# Patient Record
Sex: MALE | Race: WHITE | NOT HISPANIC OR LATINO | URBAN - METROPOLITAN AREA
[De-identification: names, ages, dates, MRNs, and addresses within clinical notes are randomized per-mention and may not be internally consistent; named-entity substitution may affect disease eponyms.]

---

## 2022-04-16 ENCOUNTER — EMERGENCY (EMERGENCY)
Facility: HOSPITAL | Age: 47
LOS: 1 days | Discharge: HOME | End: 2022-04-16
Attending: EMERGENCY MEDICINE | Admitting: EMERGENCY MEDICINE
Payer: COMMERCIAL

## 2022-04-16 VITALS
OXYGEN SATURATION: 98 % | TEMPERATURE: 98 F | RESPIRATION RATE: 18 BRPM | WEIGHT: 199.96 LBS | HEART RATE: 88 BPM | DIASTOLIC BLOOD PRESSURE: 112 MMHG | SYSTOLIC BLOOD PRESSURE: 163 MMHG

## 2022-04-16 VITALS
TEMPERATURE: 98 F | DIASTOLIC BLOOD PRESSURE: 90 MMHG | HEART RATE: 82 BPM | OXYGEN SATURATION: 97 % | RESPIRATION RATE: 16 BRPM | SYSTOLIC BLOOD PRESSURE: 142 MMHG

## 2022-04-16 PROCEDURE — 99284 EMERGENCY DEPT VISIT MOD MDM: CPT

## 2022-04-16 PROCEDURE — 73140 X-RAY EXAM OF FINGER(S): CPT | Mod: 26,LT

## 2022-04-16 PROCEDURE — 99283 EMERGENCY DEPT VISIT LOW MDM: CPT | Mod: 25

## 2022-04-16 PROCEDURE — 90471 IMMUNIZATION ADMIN: CPT

## 2022-04-16 PROCEDURE — 90715 TDAP VACCINE 7 YRS/> IM: CPT

## 2022-04-16 PROCEDURE — 73140 X-RAY EXAM OF FINGER(S): CPT

## 2022-04-16 RX ORDER — TETANUS TOXOID, REDUCED DIPHTHERIA TOXOID AND ACELLULAR PERTUSSIS VACCINE, ADSORBED 5; 2.5; 8; 8; 2.5 [IU]/.5ML; [IU]/.5ML; UG/.5ML; UG/.5ML; UG/.5ML
0.5 SUSPENSION INTRAMUSCULAR ONCE
Refills: 0 | Status: COMPLETED | OUTPATIENT
Start: 2022-04-16 | End: 2022-04-16

## 2022-04-16 RX ADMIN — TETANUS TOXOID, REDUCED DIPHTHERIA TOXOID AND ACELLULAR PERTUSSIS VACCINE, ADSORBED 0.5 MILLILITER(S): 5; 2.5; 8; 8; 2.5 SUSPENSION INTRAMUSCULAR at 10:51

## 2022-04-16 NOTE — ED PROVIDER NOTE - PATIENT PORTAL LINK FT
You can access the FollowMyHealth Patient Portal offered by [facility] by registering at the following website: [email or web address]. By joining [employer]’s FollowMyHealth portal, you will also be able to view your health information using other applications (apps) compatible with our system.

## 2022-04-16 NOTE — ED PROVIDER NOTE - PROVIDER TOKENS
PROVIDER:[TOKEN:[[id number]],FOLLOWUP:[1-3 Days]] PROVIDER:[TOKEN:[[id number]],FOLLOWUP:[1-3 Days]]

## 2022-04-16 NOTE — ED PROVIDER NOTE - ATTENDING CONTRIBUTION TO CARE
46 yo white male with fall here c/o isolated left hand pain. Exam revealed white male in NAD with marked left 4th finger tenderness, proximal>distal. I agree with plan and management outlined by PA.

## 2022-04-16 NOTE — ED PROVIDER NOTE - CARE PROVIDER_API CALL
Bimal Rdz)  Orthopaedic Surgery; Surgery of the Hand  [address] [place] [address]  Phone: [phone]  Fax: [phone]  Follow Up Time: 1-3 Days   Aki Calero (MD)  Plastic Surgery; Surgery of the Hand  [address], [address]  [place] [address]  Phone: [phone]  Fax: [phone]  Follow Up Time: 1-3 Days

## 2022-04-16 NOTE — ED PROVIDER NOTE - OBJECTIVE STATEMENT
48 y/o male without reported PMHx presents today due to fall down steps. pt reports he was carrying his baby, was able to stop himself from reaching bottom of steps but sustained left 4th finger injury. pt describes pain as aching, non-radiating, and currently 5/10. pt sustained abrasion to distal finger, pt is not UTD with tetanus. pt denies head injury, gaping laceration, numbness/weakness, headache, blood thinner use, back pain, hip pain, or any other complaints.

## 2022-04-16 NOTE — ED ADULT NURSE NOTE - NSIMPLEMENTINTERV_GEN_ALL_ED
Implemented All Universal Safety Interventions:  [place] to call system. Call bell, personal items and telephone within reach. Instruct patient to call for assistance. Room bathroom lighting operational. Non-slip footwear when patient is off stretcher. Physically safe environment: no spills, clutter or unnecessary equipment. Stretcher in lowest position, wheels locked, appropriate side rails in place.

## 2022-04-16 NOTE — ED PROVIDER NOTE - PROGRESS NOTE DETAILS
splint applied. All questions were answered. Discussed the importance of prompt, close medical follow-up. Patient will return with any changes, concerns or persistent/worsening symptoms.  Patient verbalized understanding. splint applied. Discussed with Dr. Calero advised splint and follow up monday. All questions were answered. Discussed the importance of prompt, close medical follow-up. Patient will return with any changes, concerns or persistent/worsening symptoms.  Patient verbalized understanding.

## 2022-04-16 NOTE — ED PROVIDER NOTE - NSFOLLOWUPINSTRUCTIONS_ED_ALL_ED_FT
1) Follow-up with hand specialist, See referred doctor. Call today/next business day for close, prompt follow-up.  2) Return to Emergency room for any worsening or persistent pain, weakness, numbness, fever, color change to extremity, or any other concerning symptoms.  3) Take ibuprofen 600 mg every  6 hours as needed.   4) You can consider discussing with your doctor if physical therapy or further imaging as an MRI may be beneficial.       Finger Fracture, Adult      A finger fracture is a break in any of the bones in your fingers. Your doctor may put a splint or cast on your finger so it will not move while it heals.      What are the causes?    The main cause of a finger fracture is injury, such as from playing sports, falling, or closing your finger in a drawer or door.      What increases the risk?    You may be more likely to get this condition:  •Playing sports.      •Working with machinery.      •If you have weak bones (osteoporosis).        What are the signs or symptoms?    The main symptoms of a fractured finger are pain, bruising, and swelling shortly after the injury.      How is this treated?    Treatment depends on how severe your fracture is. If the bones are still in place, your doctor may put your finger in a splint. If many fingers are fractured, you may need a cast. A cast may be placed up to the elbow. This will keep your fingers and hand from moving.    If the bones are out of place, your doctor may move them back into place by hand or by surgery.    You may also need to do physical therapy exercises to help your finger move better and get stronger.      Follow these instructions at home:      If you have a removable splint:   Hand showing finger splint on index and middle fingers and wrist.   •Wear the splint as told by your doctor. Take it off only as told by your doctor.      •Check the skin around the splint every day. Tell your doctor if you see problems.    •Loosen the splint if your fingers:  •Tingle.      •Become numb.      •Turn cold and blue.        •Keep the splint clean and dry.      If you have a nonremovable cast:     • Do not put pressure on any part of the cast until it is fully hardened.      • Do not stick anything inside the cast to scratch your skin.      •Check the skin around the cast every day. Tell your doctor if you see problems.      •You may put lotion on dry skin around the cast. Do not put lotion on the skin under the cast.      •Keep the cast clean and dry.      Bathing     • Do not take baths, swim, or use a hot tub. Ask your doctor if you may take showers.    •If the splint or cast is not waterproof:  •Do not let it get wet.      •Cover it with a watertight covering when you take a bath or shower.        Managing pain, stiffness, and swelling   •If told, put ice on the injured area. To do this:  •If you have a removable splint, take it off as told by your doctor.      •Put ice in a plastic bag.      •Place a towel between your skin and the bag, or between your cast and the bag.      •Leave the ice on for 20 minutes, 2–3 times a day.      •Take off the ice if your skin turns bright red. This is very important. If you cannot feel pain, heat, or cold, you have a greater risk of damage to the area.        •Move your fingers often.      •Raise the injured area above the level of your heart while you are sitting or lying down.      Activity     •Ask your doctor if you should avoid driving or using machines while you are taking your medicine.      •Do physical therapy exercises as told by your doctor.      •Return to your normal activities when your doctor says that it is safe.      •Ask your doctor when it is safe to drive if you have a splint or cast on your finger.      General instructions     • Do not smoke or use any products that contain nicotine or tobacco. Using these products can make it take longer for your bones to heal. If you need help quitting, ask your doctor.      •Take over-the-counter and prescription medicines only as told by your doctor.      •Keep all follow-up visits.        Contact a doctor if:    •Your pain or swelling gets worse, even with treatment.      •You have trouble moving your finger.        Get help right away if:    •Your finger gets numb or turns blue.        Summary    •A finger fracture is a break in any of the bones in your fingers.      •You may need to wear a splint or cast on your finger, so it will not move while it heals.      •If told, put ice on the injured area for 20 minutes, 2–3 times a day.      This information is not intended to replace advice given to you by your health care provider. Make sure you discuss any questions you have with your health care provider.

## 2022-04-16 NOTE — ED PROVIDER NOTE - CLINICAL SUMMARY MEDICAL DECISION MAKING FREE TEXT BOX
48 yo white male fell on stairs at home with 3+ yo son here now c/o left 4th finger area pain requiring evaluation and imaging.

## 2022-04-16 NOTE — ED PROVIDER NOTE - PHYSICAL EXAMINATION
Constitutional: Awake, Alert, non-toxic. NAD.  HEAD: Normocephalic, atraumatic.   EYES: PERRL, EOM intact, conjunctiva and sclera are clear bilaterally. No raccoon eyes.   ENT: No torres sign. No rhinorrhea, normal pharynx, patent, no tonsillar exudate or enlargement, mucous membranes pink/moist, no erythema, no drooling or stridor.   NECK: Supple, non-tender  BACK: No midline or paraspinal TTP of cervical/thoracic/lumbar spine, FROM. No ecchymosis or hematomas.   RESPIRATORY: Normal respiratory effort;  EXTREMITIES: Full passive and active ROM in all extremities; (+) left 4th finger PIP TTP and swelling, (+) limited ROM, (+) distal finger abrasion, no gaping laceration, cap refill intact, wrist/shoulder/elbow non-tender to palpation; distal pulses palpable and symmetric, no edema, no crepitus or step off  SKIN: Warm, dry; good skin turgor, no apparent lesions or rashes, no ecchymosis  NEURO: A&O x3. Sensory and motor functions are grossly intact. Speech is normal. Appearance and judgement seem appropriate for gender and age. No neurological deficits. Neurovascular sensation intact motor function 5/5 of upper and lower extremities

## 2025-03-17 NOTE — ED ADULT NURSE NOTE - OBJECTIVE STATEMENT
pt aox4, " fell today at home, lt 4th finger injury, small abrasion to distal area" FROM good cap refill, no active bleeding Received sign out from Dr. Ruano -- patient presented with abdominal pain which, on my reassessment, has resolved. Had labs, UA and US of pelvis which were unremarkable. US of appendix without visualization, no secondary signs of appendicitis.    The patient now is pain free with soft, NT abdomen -- discussed CT scan with dad, however given lack of sx at this time and normal labs, he prefers to follow up with PMD. Advised that appendicitis has not been ruled out, patient needs re-eval within 48 hours and immediate re-eval for any new or worsening symptoms.    Will dc home with close monitoring and follow up.